# Patient Record
Sex: FEMALE | URBAN - NONMETROPOLITAN AREA
[De-identification: names, ages, dates, MRNs, and addresses within clinical notes are randomized per-mention and may not be internally consistent; named-entity substitution may affect disease eponyms.]

---

## 2022-07-13 ENCOUNTER — HISTORICAL (OUTPATIENT)
Dept: ADMINISTRATIVE | Facility: HOSPITAL | Age: 27
End: 2022-07-13

## 2022-07-15 ENCOUNTER — HISTORICAL (OUTPATIENT)
Dept: ADMINISTRATIVE | Facility: HOSPITAL | Age: 27
End: 2022-07-15

## 2022-07-17 ENCOUNTER — HISTORICAL (OUTPATIENT)
Dept: ADMINISTRATIVE | Facility: HOSPITAL | Age: 27
End: 2022-07-17

## 2024-06-21 ENCOUNTER — TELEPHONE (OUTPATIENT)
Dept: GASTROENTEROLOGY | Facility: CLINIC | Age: 29
End: 2024-06-21
Payer: MEDICAID

## 2024-06-21 NOTE — TELEPHONE ENCOUNTER
Called to notify patient that we do no accept medicaid , she stated that she will get with her PCP ,  joesph

## 2024-06-21 NOTE — TELEPHONE ENCOUNTER
----- Message from Ngoc Harrison MA sent at 6/20/2024  1:50 PM CDT -----  Contact: self    ----- Message -----  From: Najma Zamora  Sent: 6/20/2024  12:23 PM CDT  To: Niles[facility] VALENT[facility] Staff    Type:  Sooner Apoointment Request    Caller is requesting a sooner appointment.  Caller declined first available appointment listed below.  Caller will not accept being placed on the waitlist and is requesting a message be sent to doctor.  Name of Caller:Lizz Patterson  When is the first available appointment?09/2024  Symptoms:hiatial hernia, stomach issues,bloating ,abdominal discomfort,frequent bowel movements  Would the patient rather a call back or a response via [facility]?   Best Call Back Number:[phone]  Additional Information: n/a